# Patient Record
Sex: FEMALE
[De-identification: names, ages, dates, MRNs, and addresses within clinical notes are randomized per-mention and may not be internally consistent; named-entity substitution may affect disease eponyms.]

---

## 2019-02-21 PROBLEM — Z00.00 ENCOUNTER FOR PREVENTIVE HEALTH EXAMINATION: Status: ACTIVE | Noted: 2019-02-21

## 2019-04-08 ENCOUNTER — RESULT REVIEW (OUTPATIENT)
Age: 44
End: 2019-04-08

## 2019-04-12 ENCOUNTER — APPOINTMENT (OUTPATIENT)
Dept: BREAST CENTER | Facility: CLINIC | Age: 44
End: 2019-04-12
Payer: COMMERCIAL

## 2019-04-12 VITALS
DIASTOLIC BLOOD PRESSURE: 84 MMHG | SYSTOLIC BLOOD PRESSURE: 127 MMHG | TEMPERATURE: 97.8 F | BODY MASS INDEX: 25.86 KG/M2 | WEIGHT: 137 LBS | HEART RATE: 75 BPM | HEIGHT: 61 IN

## 2019-04-12 DIAGNOSIS — Z63.5 DISRUPTION OF FAMILY BY SEPARATION AND DIVORCE: ICD-10-CM

## 2019-04-12 DIAGNOSIS — Z82.49 FAMILY HISTORY OF ISCHEMIC HEART DISEASE AND OTHER DISEASES OF THE CIRCULATORY SYSTEM: ICD-10-CM

## 2019-04-12 DIAGNOSIS — Z83.3 FAMILY HISTORY OF DIABETES MELLITUS: ICD-10-CM

## 2019-04-12 DIAGNOSIS — Z86.79 PERSONAL HISTORY OF OTHER DISEASES OF THE CIRCULATORY SYSTEM: ICD-10-CM

## 2019-04-12 DIAGNOSIS — Z78.9 OTHER SPECIFIED HEALTH STATUS: ICD-10-CM

## 2019-04-12 PROCEDURE — 99243 OFF/OP CNSLTJ NEW/EST LOW 30: CPT

## 2019-04-12 RX ORDER — NIFEDIPINE 60 MG/1
60 TABLET, EXTENDED RELEASE ORAL
Refills: 0 | Status: ACTIVE | COMMUNITY

## 2019-04-12 SDOH — SOCIAL STABILITY - SOCIAL INSECURITY: DISRUPTION OF FAMILY BY SEPARATION AND DIVORCE: Z63.5

## 2019-04-12 NOTE — PHYSICAL EXAM
[Normocephalic] : normocephalic [Atraumatic] : atraumatic [Supple] : supple [No Supraclavicular Adenopathy] : no supraclavicular adenopathy [No Thyromegaly] : no thyromegaly [Examined in the supine and seated position] : examined in the supine and seated position [Bra Size: ___] : Bra Size: [unfilled] [No dominant masses] : no dominant masses in right breast  [No dominant masses] : no dominant masses left breast [No Nipple Retraction] : no left nipple retraction [No Nipple Discharge] : no left nipple discharge [No Axillary Lymphadenopathy] : no left axillary lymphadenopathy [No Edema] : no edema [No Swelling] : no swelling [Full ROM] : full range of motion [No Rashes] : no rashes [No Ulceration] : no ulceration Attending Attestation (For Attendings USE Only)...

## 2019-06-26 NOTE — ADDENDUM
[FreeTextEntry1] : 6/14/19 [facility] US: compared to 2018, R 5:00 N3 stable (0.3cm) cyst. Adjacent (0,3cm) cyst sl diminished\par                                                                 R 9:00 N6 previous hypoechoic lesion not visualized.\par                                                                 R RA stable (0.5cm) hypechoic lesion.\par                                                                 L 12:00 N1 (0.4cm) stable hypoechoic lesion\par                                                                 L 12:00 RA (0.8cm) elongated stable hypoechoic lesion.\par                                                                 L 3:00 N4 (0.3cm) stable hypoechoic lesion.\par                                                                 L 6:00 N2 (0.3cm) stable cyst. BR3 \par Arnaldo US rec for stability and density.\par \par                             \par

## 2019-06-26 NOTE — DATA REVIEWED
[FreeTextEntry1] : 8/28/18 [facility] SmmgT, no priors available for comparison, dense, arnaldo scattered calcs, arnaldo benign appearing LN's, BR2D. US rec.\par 12/13/18 [facility] US, no comparison, scattered cysts and hypoechoic lesions which may reflect complex cysts or FA's, R: 5:00 N3 (0.4cm)(0.5cm) are 2 adjacent cysts, 9:00 N6 (0.3cm) and RA (0.5cm) hypoechoic lesions; L 12:00 N1 (0.4cm), 3:00 N4 (0.4cm) are hypoechoic lesions, 12:00 RA (0.8cm) elongated hypoechoic lesion, 6:00 N2 probable subcentimeter cyst, 11:00 N1 (0.6cm) mildly complex cyst. BR3 [facility] US 6/19 rec. \par

## 2019-06-26 NOTE — PAST MEDICAL HISTORY
[Menarche Age ____] : age at menarche was [unfilled] [Definite ___ (Date)] : the last menstrual period was [unfilled] [Total Preg ___] : G[unfilled] [Age At Live Birth ___] : Age at live birth: [unfilled] [FreeTextEntry6] : no [FreeTextEntry7] : no [FreeTextEntry8] : no

## 2019-06-26 NOTE — HISTORY OF PRESENT ILLNESS
[FreeTextEntry1] : 42 y/o female, here for initial consult referred by Dr. Roseanna Ortega for BR3 US.\par No FHx breast cancer or ovarian cancer. \par 8/28/18 [facility] SmmgT, no priors available for comparison, dense, arnaldo scattered calcs, arnaldo benign appearing LN's, BR2D. US rec.\par 12/13/18 [facility] US, no comparison, scattered cysts and hypoechoic lesions which may reflect complex cysts or FA's, R: 5:00 N3 (0.4cm)(0.5cm) are 2 adjacent cysts, 9:00 N6 (0.3cm) and RA (0.5cm) hypoechoic lesions; L 12:00 N1 (0.4cm), 3:00 N4 (0.4cm) are hypoechoic lesions, 12:00 RA (0.8cm) elongated hypoechoic lesion, 6:00 N2 probable subcentimeter cyst, 11:00 N1 (0.6cm) mildly complex cyst. BR3 [facility] US 6/19 rec. \par Pt denies any breast lesions, discharge or masses.\par \par \par \par 42 y/o female, here for initial consult referred by Dr. Roseanna Ortega for BR3 US.\par No FHx breast cancer.

## 2019-06-26 NOTE — ASSESSMENT
[FreeTextEntry1] : 42 y/o female, here for initial consult referred by Dr. Roseanna Ortega for BR3 US.\par No FHx breast cancer or ovarian cancer. \par 8/28/18 [facility] SmmgT, no priors available for comparison, dense, arnaldo scattered calcs, arnaldo benign appearing LN's, BR2D. US rec.\par 12/13/18 [facility] US, no comparison, scattered cysts and hypoechoic lesions which may reflect complex cysts or FA's, R: 5:00 N3 (0.4cm)(0.5cm) are 2 adjacent cysts, 9:00 N6 (0.3cm) and RA (0.5cm) hypoechoic lesions; L 12:00 N1 (0.4cm), 3:00 N4 (0.4cm) are hypoechoic lesions, 12:00 RA (0.8cm) elongated hypoechoic lesion, 6:00 N2 probable subcentimeter cyst, 11:00 N1 (0.6cm) mildly complex cyst. BR3 [facility] US 6/19 rec. \par Pt denies any breast lesions, discharge or masses.\par CBE: Bilat FCC, no discrete masses or lesions. \par Reviewed results with pt and also studies reviewed on [facility] portal.  Rec bilat u/s in 6 mos and then f.u.-due 6/19.

## 2019-08-28 ENCOUNTER — APPOINTMENT (OUTPATIENT)
Dept: BREAST CENTER | Facility: CLINIC | Age: 44
End: 2019-08-28
Payer: COMMERCIAL

## 2019-08-28 ENCOUNTER — TRANSCRIPTION ENCOUNTER (OUTPATIENT)
Age: 44
End: 2019-08-28

## 2019-08-28 VITALS — HEART RATE: 63 BPM | DIASTOLIC BLOOD PRESSURE: 85 MMHG | SYSTOLIC BLOOD PRESSURE: 143 MMHG | TEMPERATURE: 98.3 F

## 2019-08-28 PROCEDURE — 99213 OFFICE O/P EST LOW 20 MIN: CPT

## 2019-08-28 NOTE — DATA REVIEWED
[FreeTextEntry1] : 6/14/19 [facility] US: compared to 2018, R 5:00 N3 stable (0.3cm) cyst. Adjacent (0,3cm) cyst sl diminished\par     R 9:00 N6 previous hypoechoic lesion not visualized.\par     R RA stable (0.5cm) hypechoic lesion.\par     L 12:00 N1 (0.4cm) stable hypoechoic lesion\par     L 12:00 RA (0.8cm) elongated stable hypoechoic lesion.\par     L 3:00 N4 (0.3cm) stable hypoechoic lesion.\par     L 6:00 N2 (0.3cm) stable cyst. BR3 \par Arnaldo US rec for stability and density.

## 2019-08-28 NOTE — PHYSICAL EXAM
[Normocephalic] : normocephalic [Atraumatic] : atraumatic [No Supraclavicular Adenopathy] : no supraclavicular adenopathy [Supple] : supple [Examined in the supine and seated position] : examined in the supine and seated position [No Thyromegaly] : no thyromegaly [No dominant masses] : no dominant masses in right breast  [Symmetrical] : symmetrical [No dominant masses] : no dominant masses left breast [No Nipple Discharge] : no left nipple discharge [No Nipple Retraction] : no left nipple retraction [Breast Mass Right Breast ___cm] : no masses [Breast Mass Left Breast ___cm] : no masses [No Axillary Lymphadenopathy] : no left axillary lymphadenopathy [No Edema] : no edema [No Rashes] : no rashes [No Ulceration] : no ulceration [Breast Nipple Inversion] : nipples not inverted [Breast Nipple Fissures] : nipples not fissured [Breast Nipple Retraction] : nipples not retracted [Breast Nipple Flattening] : nipples not flattened [Breast Abnormal Secretion Bloody Fluid] : no bloody discharge [Breast Abnormal Lactation (Galactorrhea)] : no galactorrhea [Breast Abnormal Secretion Opalescent Fluid] : no milky discharge [Breast Abnormal Secretion Serous Fluid] : no serous discharge [de-identified] : FC changes w/o palp mass, slight tenderness glandular tissue UOQ [de-identified] : FC changes w/o palp mass

## 2019-08-28 NOTE — ASSESSMENT
[FreeTextEntry1] : 44 y/o female, here US results and follow up breast cysts.\par No FHx breast cancer or other cancers. Denies any breast lesions, discharge or masses. Breasts are tender with cycle.\par 6/14/19 [facility] US: compared to 2018, R 5:00 N3 stable (0.3cm) cyst. Adjacent (0,3cm) cyst sl diminished\par     R 9:00 N6 previous hypoechoic lesion not visualized.\par     R RA stable (0.5cm) hypechoic lesion.\par     L 12:00 N1 (0.4cm) stable hypoechoic lesion\par     L 12:00 RA (0.8cm) elongated stable hypoechoic lesion.\par     L 3:00 N4 (0.3cm) stable hypoechoic lesion.\par     L 6:00 N2 (0.3cm) stable cyst. BR3 \par [facility] US rec for stability and density.\par 8/28/18 [facility] SmmgT, no priors available for comparison, dense, aranldo scattered calcs, arnaldo benign appearing LN's, BR2D. US rec.\par 12/13/18 [facility] US, no comparison, scattered cysts and hypoechoic lesions which may reflect complex cysts or FA's, R: 5:00 N3 (0.4cm)(0.5cm) are 2 adjacent cysts, 9:00 N6 (0.3cm) and RA (0.5cm) hypoechoic lesions; L 12:00 N1 (0.4cm), 3:00 N4 (0.4cm) are hypoechoic lesions, 12:00 RA (0.8cm) elongated hypoechoic lesion, 6:00 N2 probable subcentimeter cyst, 11:00 N1 (0.6cm) mildly complex cyst. BR3 [facility] US 6/19 rec. \par CBE: BAILEY, Arnaldo fluctuating simple/complex cysts\par Reviewed recent US report. Consider Evening Primrose Oil supplement for mild cyclic discomfort secondary to cyst.\par

## 2019-08-28 NOTE — HISTORY OF PRESENT ILLNESS
[FreeTextEntry1] : 42 y/o female, here US results and follow up breast cysts.\par No FHx breast cancer or other cancers. Denies any breast lesions, discharge or masses. Breasts are tender with cycle.\par 6/14/19 [facility] US: compared to 2018, R 5:00 N3 stable (0.3cm) cyst. Adjacent (0,3cm) cyst sl diminished\par     R 9:00 N6 previous hypoechoic lesion not visualized.\par     R RA stable (0.5cm) hypechoic lesion.\par     L 12:00 N1 (0.4cm) stable hypoechoic lesion\par     L 12:00 RA (0.8cm) elongated stable hypoechoic lesion.\par     L 3:00 N4 (0.3cm) stable hypoechoic lesion.\par     L 6:00 N2 (0.3cm) stable cyst. BR3 \par [facility] US rec for stability and density.\par 8/28/18 [facility] SmmgT, no priors available for comparison, dense, arnaldo scattered calcs, arnaldo benign appearing LN's, BR2D. US rec.\par 12/13/18 [facility] US, no comparison, scattered cysts and hypoechoic lesions which may reflect complex cysts or FA's, R: 5:00 N3 (0.4cm)(0.5cm) are 2 adjacent cysts, 9:00 N6 (0.3cm) and RA (0.5cm) hypoechoic lesions; L 12:00 N1 (0.4cm), 3:00 N4 (0.4cm) are hypoechoic lesions, 12:00 RA (0.8cm) elongated hypoechoic lesion, 6:00 N2 probable subcentimeter cyst, 11:00 N1 (0.6cm) mildly complex cyst. BR3 [facility] US 6/19 rec. \par

## 2019-08-28 NOTE — PAST MEDICAL HISTORY
[Menarche Age ____] : age at menarche was [unfilled] [Total Preg ___] : G[unfilled] [Age At Live Birth ___] : Age at live birth: [unfilled] [Regular Cycle Intervals] : have been regular [Definite ___ (Date)] : the last menstrual period was [unfilled] [FreeTextEntry7] : no [FreeTextEntry6] : no [FreeTextEntry8] : no

## 2020-01-07 ENCOUNTER — APPOINTMENT (OUTPATIENT)
Dept: BREAST CENTER | Facility: CLINIC | Age: 45
End: 2020-01-07

## 2020-01-21 ENCOUNTER — APPOINTMENT (OUTPATIENT)
Dept: BREAST CENTER | Facility: CLINIC | Age: 45
End: 2020-01-21
Payer: COMMERCIAL

## 2020-01-21 VITALS
WEIGHT: 142 LBS | SYSTOLIC BLOOD PRESSURE: 135 MMHG | HEART RATE: 76 BPM | OXYGEN SATURATION: 99 % | HEIGHT: 61 IN | BODY MASS INDEX: 26.81 KG/M2 | TEMPERATURE: 98.5 F | DIASTOLIC BLOOD PRESSURE: 81 MMHG

## 2020-01-21 DIAGNOSIS — R92.8 OTHER ABNORMAL AND INCONCLUSIVE FINDINGS ON DIAGNOSTIC IMAGING OF BREAST: ICD-10-CM

## 2020-01-21 PROCEDURE — 99213 OFFICE O/P EST LOW 20 MIN: CPT

## 2020-01-21 NOTE — PHYSICAL EXAM
[Normocephalic] : normocephalic [Atraumatic] : atraumatic [Supple] : supple [No Supraclavicular Adenopathy] : no supraclavicular adenopathy [No Thyromegaly] : no thyromegaly [Examined in the supine and seated position] : examined in the supine and seated position [Symmetrical] : symmetrical [No dominant masses] : no dominant masses in right breast  [No dominant masses] : no dominant masses left breast [No Nipple Retraction] : no left nipple retraction [No Nipple Discharge] : no left nipple discharge [Breast Mass Right Breast ___cm] : no masses [Breast Mass Left Breast ___cm] : no masses [No Axillary Lymphadenopathy] : no left axillary lymphadenopathy [No Edema] : no edema [No Rashes] : no rashes [No Ulceration] : no ulceration [Breast Nipple Retraction] : nipples not retracted [Breast Nipple Inversion] : nipples not inverted [Breast Nipple Flattening] : nipples not flattened [Breast Nipple Fissures] : nipples not fissured [Breast Abnormal Lactation (Galactorrhea)] : no galactorrhea [Breast Abnormal Secretion Bloody Fluid] : no bloody discharge [Breast Abnormal Secretion Serous Fluid] : no serous discharge [Breast Abnormal Secretion Opalescent Fluid] : no milky discharge [de-identified] : FC changes w/o palp mass, slight tenderness glandular tissue UOQ [de-identified] : FC changes w/o palp mass

## 2020-01-21 NOTE — ASSESSMENT
[FreeTextEntry1] : 45 y/o female, here for mmg/US results and follow up breast cysts. Denies any breast lesions, discharge or masses. Reports nipples are itchy bilaterally X 1 week. Denies use of new soap, lesions or nipple d/c. No new reports of cancer in family.\par \par 12/23/19 [facility] Smmg[facility]/US: compared to 2018, dense, arnaldo punctuate calcs, benign axillary LN. No susp findings. BR2D\par \par 12/23/19 [facility] US: priors to 2018, arnaldo benign LN \par   R 5:00 N3 (2mm) cyst (dec from 5mm)\par      RA (5mm) stable hypoechoic lesion.\par   L 12:00 N1 (3mm) hypoechoic lesion (dec from 4mm)\par      12:00 RA (7mm) simple cyst (dec from 8mm)\par      3:00 N4 (3mm) cyst (dec from 4mm)\par      6:00 N2 (4mm) stable simple cyst. BR2D\par \par \par No FHx breast cancer or other cancers. Breasts are tender with cycle.\par 6/14/19 [facility] US: compared to 2018, R 5:00 N3 stable (0.3cm) cyst. Adjacent (0,3cm) cyst sl diminished\par     R 9:00 N6 previous hypoechoic lesion not visualized.\par     R RA stable (0.5cm) hypechoic lesion.\par     L 12:00 N1 (0.4cm) stable hypoechoic lesion\par     L 12:00 RA (0.8cm) elongated stable hypoechoic lesion.\par     L 3:00 N4 (0.3cm) stable hypoechoic lesion.\par     L 6:00 N2 (0.3cm) stable cyst. BR3 \par Ar[facility]do US rec for stability and density.\par 8/28/18 [facility] SmmgT, no priors available for comparison, dense, arnaldo scattered calcs, arnaldo benign appearing LN's, BR2D. US rec.\par 12/13/18 [facility] US, no comparison, scattered cysts and hypoechoic lesions which may reflect complex cysts or FA's, R: 5:00 N3 (0.4cm)(0.5cm) are 2 adjacent cysts, 9:00 N6 (0.3cm) and RA (0.5cm) hypoechoic lesions; L 12:00 N1 (0.4cm), 3:00 N4 (0.4cm) are hypoechoic lesions, 12:00 RA (0.8cm) elongated hypoechoic lesion, 6:00 N2 probable subcentimeter cyst, 11:00 N1 (0.6cm) mildly complex cyst. BR3 [facility] US 6/19 rec. \par BAILEY: reviewed recent mmg/US results with rec for annual screening. Pt prefers to f/u 1 year with me.\par

## 2020-01-21 NOTE — DATA REVIEWED
[FreeTextEntry1] : 12/23/19 [facility] SmmgT/US: compared to 2018, dense, arnaldo punctuate calcs, benign axillary LN. No susp findings. BR2D\par \par 12/23/19 [facility] US: priors to 2018, arnaldo benign LN \par   R 5:00 N3 (2mm) cyst (dec from 5mm)\par      RA (5mm) stable hypoechoic lesion.\par   L 12:00 N1 (3mm) hypoechoic lesion (dec from 4mm)\par      12:00 RA (7mm) simple cyst (dec from 8mm)\par      3:00 N4 (3mm) cyst (dec from 4mm)\par      6:00 N2 (4mm) stable simple cyst. BR2D\par

## 2020-01-21 NOTE — HISTORY OF PRESENT ILLNESS
[FreeTextEntry1] : 45 y/o female, here for mmg/US results and follow up breast cysts. Denies any breast lesions, discharge or masses. Reports nipples are itchy bilaterally X 1 week. Denies use of new soap, lesions or nipple d/c. No new reports of cancer in family.\par \par 12/23/19 [facility] SmmgT/US: compared to 2018, dense, arnaldo punctuate calcs, benign axillary LN. No susp findings. BR2D\par \par 12/23/19 [facility] US: priors to 2018, arnaldo benign LN \par   R 5:00 N3 (2mm) cyst (dec from 5mm)\par      RA (5mm) stable hypoechoic lesion.\par   L 12:00 N1 (3mm) hypoechoic lesion (dec from 4mm)\par      12:00 RA (7mm) simple cyst (dec from 8mm)\par      3:00 N4 (3mm) cyst (dec from 4mm)\par      6:00 N2 (4mm) stable simple cyst. BR2D\par \par \par No FHx breast cancer or other cancers. Breasts are tender with cycle.\par 6/14/19 [facility] US: compared to 2018, R 5:00 N3 stable (0.3cm) cyst. Adjacent (0,3cm) cyst sl diminished\par     R 9:00 N6 previous hypoechoic lesion not visualized.\par     R RA stable (0.5cm) hypechoic lesion.\par     L 12:00 N1 (0.4cm) stable hypoechoic lesion\par     L 12:00 RA (0.8cm) elongated stable hypoechoic lesion.\par     L 3:00 N4 (0.3cm) stable hypoechoic lesion.\par     L 6:00 N2 (0.3cm) stable cyst. BR3 \par Arnaldo US rec for stability and density.\par 8/28/18 [facility] SmmgT, no priors available for comparison, dense, arnaldo scattered calcs, arnaldo benign appearing LN's, BR2D. US rec.\par 12/13/18 [facility] US, no comparison, scattered cysts and hypoechoic lesions which may reflect complex cysts or FA's, R: 5:00 N3 (0.4cm)(0.5cm) are 2 adjacent cysts, 9:00 N6 (0.3cm) and RA (0.5cm) hypoechoic lesions; L 12:00 N1 (0.4cm), 3:00 N4 (0.4cm) are hypoechoic lesions, 12:00 RA (0.8cm) elongated hypoechoic lesion, 6:00 N2 probable subcentimeter cyst, 11:00 N1 (0.6cm) mildly complex cyst. BR3 Ar[facility]do US 6/19 rec. \par

## 2021-03-16 ENCOUNTER — APPOINTMENT (OUTPATIENT)
Dept: SURGICAL ONCOLOGY | Facility: CLINIC | Age: 46
End: 2021-03-16
Payer: COMMERCIAL

## 2021-03-16 VITALS
HEART RATE: 88 BPM | HEIGHT: 61 IN | BODY MASS INDEX: 27.94 KG/M2 | TEMPERATURE: 98.2 F | SYSTOLIC BLOOD PRESSURE: 125 MMHG | DIASTOLIC BLOOD PRESSURE: 80 MMHG | WEIGHT: 148 LBS

## 2021-03-16 DIAGNOSIS — N60.02 SOLITARY CYST OF RIGHT BREAST: ICD-10-CM

## 2021-03-16 DIAGNOSIS — N60.01 SOLITARY CYST OF RIGHT BREAST: ICD-10-CM

## 2021-03-16 DIAGNOSIS — Z87.891 PERSONAL HISTORY OF NICOTINE DEPENDENCE: ICD-10-CM

## 2021-03-16 PROCEDURE — 99213 OFFICE O/P EST LOW 20 MIN: CPT

## 2021-03-16 PROCEDURE — 99072 ADDL SUPL MATRL&STAF TM PHE: CPT

## 2021-03-16 NOTE — PAST MEDICAL HISTORY
[Menstruating] : The patient is menstruating [Menarche Age ____] : age at menarche was [unfilled] [History of Hormone Replacement Treatment] : has no history of hormone replacement treatment [Definite ___ (Date)] : the last menstrual period was [unfilled] [Regular Cycle Intervals] : have been regular [Total Preg ___] : G[unfilled] [Live Births ___] : P[unfilled]  [Age At Live Birth ___] : Age at live birth: [unfilled] [FreeTextEntry5] :  2017 [FreeTextEntry6] : none [FreeTextEntry7] : none [FreeTextEntry8] : none

## 2021-03-16 NOTE — ASSESSMENT
[FreeTextEntry1] : The patient is a 45 year old female with B/L breast cysts, most recent imaging BR2 from 1/2021. Today only with complaint of minimal amount of dried white debris noted on her nipples. We discussed that she can clear this away with a warm compress, and to avoid nipple stimulation, which may lead to discharge. Exam at this time shows normal nipple skin. \par \par She should continue yearly mammo/US and RTC in 1 year after imaging. If any new breast changes or nipple changes, she was advised to call and return sooner. details… detailed exam

## 2021-03-16 NOTE — PHYSICAL EXAM
[Normocephalic] : normocephalic [Atraumatic] : atraumatic [EOMI] : extra ocular movement intact [PERRL] : pupils equal, round and reactive to light [Supple] : supple [No Supraclavicular Adenopathy] : no supraclavicular adenopathy [No Cervical Adenopathy] : no cervical adenopathy [Examined in the supine and seated position] : examined in the supine and seated position [Asymmetrical] : asymmetrical [Bra Size: ___] : Bra Size: [unfilled] [None] : no ptosis [No dominant masses] : no dominant masses in right breast  [No dominant masses] : no dominant masses left breast [Breast Mass Right Breast ___cm] : no masses [Breast Mass Left Breast ___cm] : no masses [Breast Nipple Inversion] : nipples not inverted [Breast Nipple Retraction] : nipples not retracted [Breast Nipple Flattening] : nipples not flattened [Breast Nipple Fissures] : nipples not fissured [Breast Abnormal Lactation (Galactorrhea)] : no galactorrhea [Breast Abnormal Secretion Bloody Fluid] : no bloody discharge [Breast Abnormal Secretion Serous Fluid] : no serous discharge [Breast Abnormal Secretion Opalescent Fluid] : no milky discharge [No Axillary Lymphadenopathy] : no left axillary lymphadenopathy [No Edema] : no edema [No Swelling] : no swelling [Full ROM] : full range of motion [No Rashes] : no rashes [No Ulceration] : no ulceration [de-identified] : non-labored respirations  [de-identified] : L breast slightly larger, nipple slightly more ptotic [de-identified] : minimal amount of dried white material at the tip of the nipple, no discharge noted

## 2021-03-16 NOTE — HISTORY OF PRESENT ILLNESS
[FreeTextEntry1] : The patient is a 45 year old female presenting for follow-up of B/L breast cysts.\par \par Prior imaging:\par 1/19/2021 SM ([facility]) revealed heterogeneously dense breasts. Bilateral scattered benign-appearing calcifications. No masses.\par \par Same day US showed multiple B/L sub-cm cysts, mostly stable. Lymph nodes benign B/L. BR2.\par \par The patient reports that she's noticed some "whiteheads" at her nipples, which she picks out. She denies any discharge. No breast masses. No skin changes. \par \par

## 2021-03-16 NOTE — CONSULT LETTER
[Dear  ___] : Dear  [unfilled], [Consult Letter:] : I had the pleasure of evaluating your patient, [unfilled]. [Please see my note below.] : Please see my note below. [Sincerely,] : Sincerely, [FreeTextEntry3] : Melissa Campuzano MD\par Breast Surgeon\par Division of Surgical Oncology\par Department of Surgery\par [address] [place] [address]\[place] [address] \par Tel: [phone]\par Fax: [phone]\par Email: [email or web address]

## 2022-02-15 ENCOUNTER — APPOINTMENT (OUTPATIENT)
Dept: SURGICAL ONCOLOGY | Facility: CLINIC | Age: 47
End: 2022-02-15

## 2022-03-25 NOTE — CONSULT LETTER
[Dear  ___] : Dear  [unfilled], [Courtesy Letter:] : I had the pleasure of seeing your patient, [unfilled], in my office today. [Please see my note below.] : Please see my note below. [Consult Closing:] : Thank you very much for allowing me to participate in the care of this patient.  If you have any questions, please do not hesitate to contact me. [Sincerely,] : Sincerely, [FreeTextEntry3] : Melissa Campuzano MD\par Breast Surgeon\par Division of Surgical Oncology\par Department of Surgery\par [address] [place] [address]\[place] [address] \par Tel: [phone]\par Fax: [phone]\par Email: [email or web address]

## 2022-03-25 NOTE — HISTORY OF PRESENT ILLNESS
[FreeTextEntry1] : The patient is a 46 year old female presenting for follow-up of B/L breast cysts.\par \par Prior imaging:\par 1/19/2021 SM ([facility]) revealed heterogeneously dense breasts. Bilateral scattered benign-appearing calcifications. No masses.\par \par Same day US showed multiple B/L sub-cm cysts, mostly stable. Lymph nodes benign B/L. BR2.\par \par The patient reports that she's noticed some "whiteheads" at her nipples, which she picks out. She denies any discharge. No breast masses. No skin changes. \par \par \par 2/08/2022 B/L SM revealed heterogeneously dense breasts \par  - Neg\par  - Non-enlarged B/L LNs present\par \par 2/08/2022 B/L US\par  - R 5:00 N3 7 mm cluster of cysts\par  - R scattered sub-cm simple cysts\par  - L scattered sub-cm simple cysts\par  - Neg LNs\par  - BR2\par \par Interval history:

## 2022-03-25 NOTE — ASSESSMENT
[FreeTextEntry1] : \par The patient is a 46 year old female with B/L breast cysts, most recent imaging BR2 from 2/2022. \par \par Today only with complaint of minimal amount of dried white debris noted on her nipples. We discussed that she can clear this away with a warm compress, and to avoid nipple stimulation, which may lead to discharge. Exam at this time shows normal nipple skin.  __\par \par Plan:\par  - Next breast screening SM and US 2/2023\par  - RTO 1 year or PRN \par

## 2022-03-29 ENCOUNTER — APPOINTMENT (OUTPATIENT)
Dept: SURGICAL ONCOLOGY | Facility: CLINIC | Age: 47
End: 2022-03-29

## 2024-03-07 ENCOUNTER — OFFICE (OUTPATIENT)
Dept: URBAN - METROPOLITAN AREA CLINIC 28 | Facility: CLINIC | Age: 49
Setting detail: OPHTHALMOLOGY
End: 2024-03-07
Payer: COMMERCIAL

## 2024-03-07 DIAGNOSIS — H52.03: ICD-10-CM

## 2024-03-07 DIAGNOSIS — H10.45: ICD-10-CM

## 2024-03-07 DIAGNOSIS — H16.223: ICD-10-CM

## 2024-03-07 PROCEDURE — 92015 DETERMINE REFRACTIVE STATE: CPT | Performed by: OPHTHALMOLOGY

## 2024-03-07 PROCEDURE — 92002 INTRM OPH EXAM NEW PATIENT: CPT | Performed by: OPHTHALMOLOGY

## 2024-03-07 ASSESSMENT — REFRACTION_MANIFEST
OS_AXIS: 180
OS_AXIS: 180
OS_CYLINDER: -0.50
OS_CYLINDER: -0.50
OS_SPHERE: +0.75
OD_CYLINDER: -0.50
OD_VA1: 20/20
OS_SPHERE: +2.50
OD_SPHERE: +0.75
OS_VA1: 20/20
OD_SPHERE: +2.50
OD_CYLINDER: -0.50
OD_AXIS: 180
OD_AXIS: 180

## 2024-03-07 ASSESSMENT — REFRACTION_CURRENTRX
OS_OVR_VA: 20/
OS_AXIS: 003
OD_OVR_VA: 20/
OD_SPHERE: +2.25
OD_CYLINDER: -1.00
OD_AXIS: 009
OS_SPHERE: +2.00
OS_CYLINDER: -1.00

## 2024-03-07 ASSESSMENT — SPHEQUIV_DERIVED
OS_SPHEQUIV: 0.5
OD_SPHEQUIV: 2.25
OD_SPHEQUIV: 0.5
OS_SPHEQUIV: 2.25

## 2025-02-27 ENCOUNTER — OFFICE (OUTPATIENT)
Dept: URBAN - METROPOLITAN AREA CLINIC 28 | Facility: CLINIC | Age: 50
Setting detail: OPHTHALMOLOGY
End: 2025-02-27
Payer: MEDICARE

## 2025-02-27 DIAGNOSIS — H10.45: ICD-10-CM

## 2025-02-27 DIAGNOSIS — H16.223: ICD-10-CM

## 2025-02-27 DIAGNOSIS — H52.03: ICD-10-CM

## 2025-02-27 PROCEDURE — 92012 INTRM OPH EXAM EST PATIENT: CPT | Performed by: OPHTHALMOLOGY

## 2025-02-27 PROCEDURE — 92015 DETERMINE REFRACTIVE STATE: CPT | Performed by: OPHTHALMOLOGY

## 2025-02-27 ASSESSMENT — KERATOMETRY
OS_K2POWER_DIOPTERS: 44.00
OD_AXISANGLE_DEGREES: 089
OS_AXISANGLE_DEGREES: 088
OS_K1POWER_DIOPTERS: 42.75
OD_K1POWER_DIOPTERS: 42.75
OD_K2POWER_DIOPTERS: 44.25

## 2025-02-27 ASSESSMENT — REFRACTION_AUTOREFRACTION
OS_AXIS: 177
OD_CYLINDER: -0.75
OS_CYLINDER: -0.50
OD_AXIS: 014
OS_SPHERE: +0.75
OD_SPHERE: +0.75

## 2025-02-27 ASSESSMENT — REFRACTION_MANIFEST
OS_VA1: 20/20
OD_CYLINDER: -0.50
OD_AXIS: 180
OS_SPHERE: +2.75
OS_AXIS: 180
OS_SPHERE: +2.50
OS_CYLINDER: -0.50
OS_AXIS: 180
OD_SPHERE: +0.75
OD_AXIS: 180
OD_AXIS: 180
OS_AXIS: 180
OS_CYLINDER: -0.50
OD_SPHERE: +2.75
OD_VA1: 20/20
OS_CYLINDER: -0.50
OD_SPHERE: +2.50
OS_SPHERE: +0.75
OD_CYLINDER: -0.50
OD_CYLINDER: -0.50

## 2025-02-27 ASSESSMENT — TONOMETRY
OD_IOP_MMHG: 14
OS_IOP_MMHG: 12

## 2025-02-27 ASSESSMENT — REFRACTION_CURRENTRX
OD_CYLINDER: -1.00
OD_OVR_VA: 20/
OS_CYLINDER: -1.00
OD_AXIS: 009
OS_OVR_VA: 20/
OS_SPHERE: +2.00
OS_AXIS: 003
OD_SPHERE: +2.25

## 2025-02-27 ASSESSMENT — TEAR BREAK UP TIME (TBUT)
OD_TBUT: T
OS_TBUT: T

## 2025-02-27 ASSESSMENT — CONFRONTATIONAL VISUAL FIELD TEST (CVF)
OS_FINDINGS: FULL
OD_FINDINGS: FULL

## 2025-02-27 ASSESSMENT — VISUAL ACUITY
OS_BCVA: 20/20
OD_BCVA: 20/20-1

## 2025-02-28 ENCOUNTER — NON-APPOINTMENT (OUTPATIENT)
Age: 50
End: 2025-02-28

## 2025-02-28 ENCOUNTER — APPOINTMENT (OUTPATIENT)
Dept: OTOLARYNGOLOGY | Facility: CLINIC | Age: 50
End: 2025-02-28
Payer: COMMERCIAL

## 2025-02-28 VITALS
DIASTOLIC BLOOD PRESSURE: 86 MMHG | BODY MASS INDEX: 23.22 KG/M2 | SYSTOLIC BLOOD PRESSURE: 128 MMHG | WEIGHT: 123 LBS | HEIGHT: 61 IN | HEART RATE: 69 BPM | TEMPERATURE: 98.1 F | OXYGEN SATURATION: 99 %

## 2025-02-28 DIAGNOSIS — H90.42 SENSORINEURAL HEARING LOSS, UNILATERAL, LEFT EAR, WITH UNRESTRICTED HEARING ON THE CONTRALATERAL SIDE: ICD-10-CM

## 2025-02-28 DIAGNOSIS — M54.2 CERVICALGIA: ICD-10-CM

## 2025-02-28 DIAGNOSIS — R68.89 OTHER GENERAL SYMPTOMS AND SIGNS: ICD-10-CM

## 2025-02-28 DIAGNOSIS — H92.02 OTALGIA, LEFT EAR: ICD-10-CM

## 2025-02-28 DIAGNOSIS — H91.22 SUDDEN IDIOPATHIC HEARING LOSS, LEFT EAR: ICD-10-CM

## 2025-02-28 PROCEDURE — 92557 COMPREHENSIVE HEARING TEST: CPT

## 2025-02-28 PROCEDURE — 92550 TYMPANOMETRY & REFLEX THRESH: CPT | Mod: 52

## 2025-02-28 PROCEDURE — 99204 OFFICE O/P NEW MOD 45 MIN: CPT

## 2025-02-28 RX ORDER — OLICERIDINE 2 MG/2ML
INJECTION, SOLUTION INTRAVENOUS
Refills: 0 | Status: ACTIVE | COMMUNITY

## 2025-02-28 RX ORDER — PREDNISONE 10 MG/1
10 TABLET ORAL
Qty: 46 | Refills: 0 | Status: ACTIVE | COMMUNITY
Start: 2025-02-28 | End: 1900-01-01

## 2025-02-28 RX ORDER — OMEPRAZOLE 40 MG/1
40 CAPSULE, DELAYED RELEASE ORAL DAILY
Qty: 14 | Refills: 0 | Status: ACTIVE | COMMUNITY
Start: 2025-02-28 | End: 1900-01-01

## 2025-02-28 RX ORDER — TIRZEPATIDE 5 MG/.5ML
5 INJECTION, SOLUTION SUBCUTANEOUS
Refills: 0 | Status: ACTIVE | COMMUNITY

## 2025-03-20 ENCOUNTER — NON-APPOINTMENT (OUTPATIENT)
Age: 50
End: 2025-03-20

## 2025-03-24 ENCOUNTER — APPOINTMENT (OUTPATIENT)
Dept: OTOLARYNGOLOGY | Facility: CLINIC | Age: 50
End: 2025-03-24
Payer: COMMERCIAL

## 2025-03-24 VITALS
HEART RATE: 77 BPM | DIASTOLIC BLOOD PRESSURE: 80 MMHG | OXYGEN SATURATION: 99 % | BODY MASS INDEX: 23.22 KG/M2 | TEMPERATURE: 98.2 F | HEIGHT: 61 IN | WEIGHT: 123 LBS | SYSTOLIC BLOOD PRESSURE: 124 MMHG

## 2025-03-24 DIAGNOSIS — H90.42 SENSORINEURAL HEARING LOSS, UNILATERAL, LEFT EAR, WITH UNRESTRICTED HEARING ON THE CONTRALATERAL SIDE: ICD-10-CM

## 2025-03-24 DIAGNOSIS — H91.22 SUDDEN IDIOPATHIC HEARING LOSS, LEFT EAR: ICD-10-CM

## 2025-03-24 PROCEDURE — 99213 OFFICE O/P EST LOW 20 MIN: CPT

## 2025-04-18 ENCOUNTER — APPOINTMENT (OUTPATIENT)
Dept: OBGYN | Facility: CLINIC | Age: 50
End: 2025-04-18
Payer: COMMERCIAL

## 2025-04-18 VITALS — SYSTOLIC BLOOD PRESSURE: 117 MMHG | HEART RATE: 73 BPM | DIASTOLIC BLOOD PRESSURE: 78 MMHG | OXYGEN SATURATION: 98 %

## 2025-04-18 DIAGNOSIS — R92.30 DENSE BREASTS, UNSPECIFIED: ICD-10-CM

## 2025-04-18 DIAGNOSIS — N60.01 SOLITARY CYST OF RIGHT BREAST: ICD-10-CM

## 2025-04-18 DIAGNOSIS — Z01.419 ENCOUNTER FOR GYNECOLOGICAL EXAMINATION (GENERAL) (ROUTINE) W/OUT ABNORMAL FINDINGS: ICD-10-CM

## 2025-04-18 DIAGNOSIS — Z87.59 PERSONAL HISTORY OF OTHER COMPLICATIONS OF PREGNANCY, CHILDBIRTH AND THE PUERPERIUM: ICD-10-CM

## 2025-04-18 DIAGNOSIS — N60.02 SOLITARY CYST OF RIGHT BREAST: ICD-10-CM

## 2025-04-18 PROCEDURE — 99459 PELVIC EXAMINATION: CPT

## 2025-04-18 PROCEDURE — 99386 PREV VISIT NEW AGE 40-64: CPT

## 2025-04-18 RX ORDER — OLMESARTAN MEDOXOMIL 40 MG/1
TABLET, FILM COATED ORAL
Refills: 0 | Status: ACTIVE | COMMUNITY

## 2025-04-21 LAB — HPV HIGH+LOW RISK DNA PNL CVX: NOT DETECTED

## 2025-04-24 ENCOUNTER — TRANSCRIPTION ENCOUNTER (OUTPATIENT)
Age: 50
End: 2025-04-24

## 2025-04-24 LAB — CYTOLOGY CVX/VAG DOC THIN PREP: ABNORMAL

## 2025-07-14 ENCOUNTER — TRANSCRIPTION ENCOUNTER (OUTPATIENT)
Age: 50
End: 2025-07-14